# Patient Record
Sex: FEMALE | Race: WHITE | Employment: OTHER | URBAN - NONMETROPOLITAN AREA
[De-identification: names, ages, dates, MRNs, and addresses within clinical notes are randomized per-mention and may not be internally consistent; named-entity substitution may affect disease eponyms.]

---

## 2017-10-18 ENCOUNTER — OFFICE VISIT (OUTPATIENT)
Dept: ORTHOPEDIC SURGERY | Age: 62
End: 2017-10-18

## 2017-10-18 DIAGNOSIS — M79.644 FINGER PAIN, RIGHT: Primary | ICD-10-CM

## 2017-10-18 PROBLEM — E03.9 HYPOTHYROIDISM (ACQUIRED): Status: ACTIVE | Noted: 2017-10-18

## 2017-10-18 PROBLEM — F31.9 BIPOLAR 1 DISORDER (HCC): Status: ACTIVE | Noted: 2017-10-18

## 2017-10-18 PROCEDURE — 73140 X-RAY EXAM OF FINGER(S): CPT | Performed by: ORTHOPAEDIC SURGERY

## 2017-10-18 PROCEDURE — 99204 OFFICE O/P NEW MOD 45 MIN: CPT | Performed by: ORTHOPAEDIC SURGERY

## 2017-10-18 RX ORDER — ARIPIPRAZOLE 5 MG/1
TABLET ORAL
Refills: 3 | COMMUNITY
Start: 2017-10-11 | End: 2017-11-08 | Stop reason: SDUPTHER

## 2017-10-18 RX ORDER — ESZOPICLONE 2 MG/1
TABLET, FILM COATED ORAL
Refills: 3 | COMMUNITY
Start: 2017-10-04 | End: 2017-11-08 | Stop reason: ALTCHOICE

## 2017-10-18 RX ORDER — GABAPENTIN 300 MG/1
300-600 CAPSULE ORAL
COMMUNITY
End: 2017-11-08 | Stop reason: ALTCHOICE

## 2017-10-18 RX ORDER — ESZOPICLONE 3 MG/1
3 TABLET, FILM COATED ORAL
COMMUNITY
End: 2017-11-08 | Stop reason: ALTCHOICE

## 2017-10-18 RX ORDER — ARIPIPRAZOLE 5 MG/1
5 TABLET ORAL DAILY
COMMUNITY

## 2017-10-18 RX ORDER — INFLUENZA VIRUS VACCINE 15; 15; 15; 15 UG/.5ML; UG/.5ML; UG/.5ML; UG/.5ML
SUSPENSION INTRAMUSCULAR
Refills: 0 | COMMUNITY
Start: 2017-09-13

## 2017-10-18 RX ORDER — DIVALPROEX SODIUM 500 MG/1
1000 TABLET, EXTENDED RELEASE ORAL DAILY
COMMUNITY

## 2017-10-18 NOTE — PROGRESS NOTES
History of Present Illness:  Kostas Salguero is a 58 y.o. female 1st time for right thumb pain and catching locking pain in the morning    Location right thumb  Severity moderate  Duration several months  Associated sign/symptoms stiffness and soreness locking catching swelling    I have reviewed and discussed the below Pain assessment findings with the patient. Pain Assessment  Location of Pain: Hand  Location Modifiers: Right  Severity of Pain: 5  Quality of Pain: Sharp, Grinding  Duration of Pain: Persistent  Frequency of Pain: Intermittent  Aggravating Factors: Exercise  Limiting Behavior: Yes  Relieving Factors: Rest  Result of Injury: No  Work-Related Injury: No  Are there other pain locations you wish to document?: No    Medical History  Patient's medications, allergies, past medical, surgical, social and family histories were reviewed and updated as appropriate. History reviewed. No pertinent past medical history. History reviewed. No pertinent family history.   Social History     Social History    Marital status:      Spouse name: N/A    Number of children: N/A    Years of education: N/A     Social History Main Topics    Smoking status: None    Smokeless tobacco: None    Alcohol use None    Drug use: Unknown    Sexual activity: Not Asked     Other Topics Concern    None     Social History Narrative    None     Current Outpatient Prescriptions   Medication Sig Dispense Refill    ARIPiprazole (ABILIFY) 5 MG tablet TAKE ONE (1) TABLET BY MOUTH DAILY  3    ARIPiprazole (ABILIFY) 5 MG tablet Take 5 mg by mouth      divalproex (DEPAKOTE ER) 500 MG extended release tablet Take 1,500 mg by mouth      eszopiclone (LUNESTA) 2 MG TABS TAKE 1 TABLET BY MOUTH AT BEDTIME AS NEEDED  3    eszopiclone (LUNESTA) 3 MG TABS Take 3 mg by mouth      gabapentin (NEURONTIN) 300 MG capsule Take 300-600 mg by mouth      HYDROXYZINE HCL PO Take by mouth      FLUARIX QUADRIVALENT 0.5 ML injection TO BE

## 2017-11-02 DIAGNOSIS — M65.311 TRIGGER FINGER OF RIGHT THUMB: Primary | ICD-10-CM

## 2017-11-02 RX ORDER — MELOXICAM 15 MG/1
15 TABLET ORAL DAILY
Qty: 30 TABLET | Refills: 3 | Status: SHIPPED | OUTPATIENT
Start: 2017-11-02

## 2017-11-02 RX ORDER — HYDROCODONE BITARTRATE AND ACETAMINOPHEN 5; 325 MG/1; MG/1
1 TABLET ORAL EVERY 6 HOURS PRN
Qty: 40 TABLET | Refills: 0 | Status: SHIPPED | OUTPATIENT
Start: 2017-11-02 | End: 2017-11-09

## 2017-11-06 ENCOUNTER — TELEPHONE (OUTPATIENT)
Dept: ORTHOPEDIC SURGERY | Age: 62
End: 2017-11-06

## 2017-11-10 ENCOUNTER — HOSPITAL ENCOUNTER (OUTPATIENT)
Dept: SURGERY | Age: 62
Discharge: HOME | End: 2017-11-10
Attending: ORTHOPAEDIC SURGERY | Admitting: ORTHOPAEDIC SURGERY

## 2017-11-10 VITALS
BODY MASS INDEX: 31.23 KG/M2 | TEMPERATURE: 96.8 F | HEART RATE: 59 BPM | DIASTOLIC BLOOD PRESSURE: 72 MMHG | WEIGHT: 199 LBS | SYSTOLIC BLOOD PRESSURE: 110 MMHG | RESPIRATION RATE: 14 BRPM | HEIGHT: 67 IN | OXYGEN SATURATION: 100 %

## 2017-11-10 RX ORDER — DOCUSATE SODIUM 100 MG/1
100 CAPSULE, LIQUID FILLED ORAL 2 TIMES DAILY
Status: DISCONTINUED | OUTPATIENT
Start: 2017-11-10 | End: 2017-11-11 | Stop reason: HOSPADM

## 2017-11-10 RX ORDER — ACETAMINOPHEN 325 MG/1
650 TABLET ORAL EVERY 4 HOURS PRN
Status: DISCONTINUED | OUTPATIENT
Start: 2017-11-10 | End: 2017-11-11 | Stop reason: HOSPADM

## 2017-11-10 RX ORDER — LABETALOL HYDROCHLORIDE 5 MG/ML
5 INJECTION, SOLUTION INTRAVENOUS EVERY 10 MIN PRN
Status: DISCONTINUED | OUTPATIENT
Start: 2017-11-10 | End: 2017-11-11 | Stop reason: HOSPADM

## 2017-11-10 RX ORDER — SODIUM CHLORIDE, SODIUM LACTATE, POTASSIUM CHLORIDE, CALCIUM CHLORIDE 600; 310; 30; 20 MG/100ML; MG/100ML; MG/100ML; MG/100ML
INJECTION, SOLUTION INTRAVENOUS CONTINUOUS
Status: DISCONTINUED | OUTPATIENT
Start: 2017-11-10 | End: 2017-11-11 | Stop reason: HOSPADM

## 2017-11-10 RX ORDER — PROPRANOLOL HYDROCHLORIDE 40 MG/1
40 TABLET ORAL 2 TIMES DAILY
COMMUNITY

## 2017-11-10 RX ORDER — MEPERIDINE HYDROCHLORIDE 25 MG/ML
12.5 INJECTION INTRAMUSCULAR; INTRAVENOUS; SUBCUTANEOUS EVERY 5 MIN PRN
Status: DISCONTINUED | OUTPATIENT
Start: 2017-11-10 | End: 2017-11-11 | Stop reason: HOSPADM

## 2017-11-10 RX ORDER — HYDROMORPHONE HCL 110MG/55ML
0.25 PATIENT CONTROLLED ANALGESIA SYRINGE INTRAVENOUS EVERY 5 MIN PRN
Status: DISCONTINUED | OUTPATIENT
Start: 2017-11-10 | End: 2017-11-11 | Stop reason: HOSPADM

## 2017-11-10 RX ORDER — OXYCODONE HYDROCHLORIDE AND ACETAMINOPHEN 5; 325 MG/1; MG/1
1 TABLET ORAL
Status: ACTIVE | OUTPATIENT
Start: 2017-11-10 | End: 2017-11-10

## 2017-11-10 RX ORDER — SODIUM CHLORIDE 0.9 % (FLUSH) 0.9 %
10 SYRINGE (ML) INJECTION PRN
Status: DISCONTINUED | OUTPATIENT
Start: 2017-11-10 | End: 2017-11-11 | Stop reason: HOSPADM

## 2017-11-10 RX ORDER — HYDRALAZINE HYDROCHLORIDE 20 MG/ML
5 INJECTION INTRAMUSCULAR; INTRAVENOUS EVERY 10 MIN PRN
Status: DISCONTINUED | OUTPATIENT
Start: 2017-11-10 | End: 2017-11-11 | Stop reason: HOSPADM

## 2017-11-10 RX ORDER — ONDANSETRON 2 MG/ML
4 INJECTION INTRAMUSCULAR; INTRAVENOUS
Status: ACTIVE | OUTPATIENT
Start: 2017-11-10 | End: 2017-11-10

## 2017-11-10 RX ORDER — CEFAZOLIN SODIUM 2 G/100ML
2 INJECTION, SOLUTION INTRAVENOUS ONCE
Status: COMPLETED | OUTPATIENT
Start: 2017-11-10 | End: 2017-11-10

## 2017-11-10 RX ORDER — LIDOCAINE HYDROCHLORIDE 10 MG/ML
1 INJECTION, SOLUTION EPIDURAL; INFILTRATION; INTRACAUDAL; PERINEURAL
Status: ACTIVE | OUTPATIENT
Start: 2017-11-10 | End: 2017-11-10

## 2017-11-10 RX ORDER — ONDANSETRON 2 MG/ML
4 INJECTION INTRAMUSCULAR; INTRAVENOUS EVERY 6 HOURS PRN
Status: DISCONTINUED | OUTPATIENT
Start: 2017-11-10 | End: 2017-11-11 | Stop reason: HOSPADM

## 2017-11-10 RX ORDER — SODIUM CHLORIDE 0.9 % (FLUSH) 0.9 %
10 SYRINGE (ML) INJECTION EVERY 12 HOURS SCHEDULED
Status: DISCONTINUED | OUTPATIENT
Start: 2017-11-10 | End: 2017-11-11 | Stop reason: HOSPADM

## 2017-11-10 RX ADMIN — SODIUM CHLORIDE, SODIUM LACTATE, POTASSIUM CHLORIDE, CALCIUM CHLORIDE: 600; 310; 30; 20 INJECTION, SOLUTION INTRAVENOUS at 13:54

## 2017-11-10 RX ADMIN — CEFAZOLIN SODIUM 2 G: 2 INJECTION, SOLUTION INTRAVENOUS at 14:07

## 2017-11-10 ASSESSMENT — PAIN SCALES - GENERAL: PAINLEVEL_OUTOF10: 0

## 2017-11-10 ASSESSMENT — PAIN - FUNCTIONAL ASSESSMENT: PAIN_FUNCTIONAL_ASSESSMENT: 0-10

## 2017-11-10 NOTE — ANESTHESIA POST-OP
Anesthesia Post-op Note    Patient: Mane Thao  MRN: [id number]  YOB: 1955  Date of evaluation: 11/10/2017  Time:  2:59 PM     Procedure(s) Performed:     Last Vitals: /66   Pulse 55   Temp 97.2 °F (36.2 °C) (Temporal)   Resp 14   Ht 5' 7\" (1.702 m)   Wt 199 lb (90.3 kg)   SpO2 96%   BMI 31.17 kg/m²     George Phase I: George Score: 8    George Phase II:      Anesthesia Post Evaluation    Final anesthesia type: MAC  Patient location during evaluation: PACU  Patient participation: complete - patient participated  Level of consciousness: awake and alert  Airway patency: patent  Nausea & Vomiting: no nausea and no vomiting  Complications: no  Cardiovascular status: hemodynamically stable  Respiratory status: acceptable  Hydration status: stable        Aric March MD  2:59 PM

## 2017-11-10 NOTE — OP NOTE
[facility]       [address] [facility], [address] [place] [address]       Operative note by  Marcial Washington. Monique Avina MS, DO  Orthopedic surgeon  Orthopedics sports Fellowship trained  Board-certified  Team Physician for [employer]                       trigger finger release      Patient Name:  Mar Black    YOB: 1955    Medical  Record number: [id number]    Account number: [de-identified]     Date Of Surgery: 11/10/2017    Date Of Dictation: 11/10/2017    Location: [address]    Surgeon: Dr. Martha Ordaz    Assistant: None    Anesthesia: Local with General    Indications:  Chronic pain not alleviated by conservative therapy, positive MRI, not improved with conservative care    Complications: None    Estimated blood loss: Minimal    Preoperative antibiotics: Given and documented in the chart        Preoperative diagnosis :  1. Right thumb A1 pulley trigger finger            Postoperative diagnosis :  1. Right thumb A1 pulley trigger finger          Procedure performed:  1. Right thumb A1 pulley release             Procedure in detail:  Patient was seen and evaluated A history and physical was obtained a written consent was discussed and signed by the patient. Following the anesthesia evaluation and discussion with the patient about the scheduled procedure and recovery along with postoperative follow-up plans the patient was brought back to the operative suite put on the operative table in the supine position. At this point the patient was given a MAC anesthetic. I personally scrubbed the hand with alcohol and Betadine and injected the base of the right thumb with 5 mL total 5 mL of Marcaine 5 mL of lidocaine the lidocaine did not have epinephrine using an 22-gauge 1-1/2 inch needle.                 Following the evaluation and injection the hand was

## 2017-11-10 NOTE — ANESTHESIA PRE-OP
consumption: 11/10/17    BMI:   Wt Readings from Last 3 Encounters:   11/10/17 199 lb (90.3 kg)   11/08/17 200 lb (90.7 kg)     Body mass index is 31.17 kg/m². Anesthesia Evaluation  Patient summary reviewed and Nursing notes reviewed no history of anesthetic complications:   Airway: Mallampati: II  TM distance: >3 FB   Neck ROM: full  Mouth opening: > = 3 FB Dental:          Pulmonary:normal exam  breath sounds clear to auscultation      (-) COPD, asthma and sleep apnea                        ROS comment: Former smoker   Cardiovascular:negative ROS        (-) hypertension, past MI, CAD, CABG/stent, dysrhythmias,  angina and  CHF      Rhythm: regular  Rate: normal                    Neuro/Psych:   (+) neuromuscular disease (Tremors):, psychiatric history (Depression, Bipolar, Anxiety):   (-) seizures, TIA and CVA           GI/Hepatic/Renal: neg ROS       (-) GERD and liver disease       Endo/Other:    (+) hypothyroidism::. (-) no Type II DM               Abdominal:   (+) obese,         Vascular:                                      Anesthesia Plan      MAC     ASA 2       Induction: intravenous. Anesthetic plan and risks discussed with patient. Plan discussed with CRNA.                   Isai White MD   11/10/2017

## 2017-11-10 NOTE — PROGRESS NOTES
Pt arrived to PACU from OR in stable condition and is alert to self. Pt moves extremities to command. Respirations are even and unlabored on 3L O2 per NC. Skin warm, dry, and with normal color. Abd is soft. Pain is denied at this time. Right thumb incision surgical site(s) intact with dressing= CDI. Will continue to monitor for safety and comfort. S/P:RIGHT THUMB A-ONE PULLY TRIGGER RELEASE , with Dr. Maritza Dodson at [facility]. ASA 2, Mac anesthesia, placed in phase two care.

## 2017-11-15 ENCOUNTER — OFFICE VISIT (OUTPATIENT)
Dept: ORTHOPEDIC SURGERY | Age: 62
End: 2017-11-15

## 2017-11-15 DIAGNOSIS — M65.311 TRIGGER FINGER OF RIGHT THUMB: Primary | ICD-10-CM

## 2017-11-15 PROCEDURE — 99024 POSTOP FOLLOW-UP VISIT: CPT | Performed by: ORTHOPAEDIC SURGERY

## 2017-11-15 NOTE — PROGRESS NOTES
History of Present Illness:  Ariane Amaral is a 58 y.o. female postop right thumb A1 trigger release    Location right thumb  Severity doing great completely resolved no catching or locking  Duration 5 days  Associated sign/symptoms no pain no swelling she is doing great    I have reviewed and discussed the below Pain assessment findings with the patient. Medical History  Patient's medications, allergies, past medical, surgical, social and family histories were reviewed and updated as appropriate. Past Medical History:   Diagnosis Date    Anxiety     Cancer [facility])     BREAST CANCER    Depression     Thyroid disease      Family History   Problem Relation Age of Onset    Cancer Mother     Cancer Father      Social History     Social History    Marital status:      Spouse name: N/A    Number of children: N/A    Years of education: N/A     Social History Main Topics    Smoking status: Former Smoker    Smokeless tobacco: Never Used      Comment: QUIT [id number]    Alcohol use No    Drug use: No    Sexual activity: Not Asked     Other Topics Concern    None     Social History Narrative    None     Current Outpatient Prescriptions   Medication Sig Dispense Refill    propranolol (INDERAL) 40 MG tablet Take 40 mg by mouth 2 times daily For tremor      LEVOTHYROXINE SODIUM PO Take 125 mg by mouth daily       meloxicam (MOBIC) 15 MG tablet Take 1 tablet by mouth daily 30 tablet 3    ARIPiprazole (ABILIFY) 5 MG tablet Take 5 mg by mouth daily       divalproex (DEPAKOTE ER) 500 MG extended release tablet Take 1,000 mg by mouth daily       HYDROXYZINE HCL PO Take 25 mg by mouth 3 times daily as needed       FLUARIX QUADRIVALENT 0.5 ML injection TO BE ADMINISTERED BY PHARMACIST FOR IMMUNIZATION  0    sertraline (ZOLOFT) 50 MG tablet TAKE ONE (1) TABLET BY MOUTH AT BEDTIME  2     No current facility-administered medications for this visit.       Allergies   Allergen Reactions    Fluticasone Other (See Comments)     tamie    Prednisone Other (See Comments)     tamie       REVIEW OF SYSTEMS:   Pertinent items are noted in HPI  Review of systems reviewed from Patient History Form dated on 11/15/17 and available in the patient's chart under the Media tab. Examination:    General Exam:    Vitals: There were no vitals taken for this visit. Constitutional: Patient is adequately groomed with no evidence of malnutrition  Mental Status: The patient is oriented to time, place and person. The patient's mood and affect are appropriate. thumb Examination  Inspection: no swelling no deformity nicely healed incision     Palpation:  No pain to palpation    Rang of Motion:  Full active and passive range of motion with no catching or locking no clicking    Strength:  Fingers strength is excellent there is no tendon injury    Special Tests:  Sensation is intact capillary refill is brisk incision is clean and dry no erythema no accidents    Gait: Normal    Additional Comments:     Additional Examinations:  Left Upper Extremity: Examination of the left upper extremity does not show any tenderness, deformity or injury. Range of motion is unremarkable. There is no gross instability. There are no rashes, ulcerations or lesions. Strength and tone are normal.  Neck: Examination of the neck does not show any tenderness, deformity or injury. Range of motion is unremarkable. There is no gross instability. There are no rashes, ulcerations or lesions. Strength and tone are normal.              Assessment:  Right postop A1 Pulley release     Impression: Same    Office Procedures:  No orders of the defined types were placed in this encounter.       Treatment Plan:  Postop right thumb diffuses follow-up in a few weeks sooner if she should've any concerns or problems         Janel Steve,

## 2020-08-05 ENCOUNTER — OFFICE VISIT (OUTPATIENT)
Dept: ORTHOPEDIC SURGERY | Age: 65
End: 2020-08-05
Payer: MEDICARE

## 2020-08-05 VITALS — BODY MASS INDEX: 28.25 KG/M2 | HEIGHT: 67 IN | WEIGHT: 180 LBS

## 2020-08-05 PROCEDURE — 99204 OFFICE O/P NEW MOD 45 MIN: CPT | Performed by: ORTHOPAEDIC SURGERY

## 2020-08-05 NOTE — PROGRESS NOTES
8/5/20  History of Present Illness:  Jessica Izaguirre is a 72 y.o. female being seen for the first time right-sided SI joint/lumbar pain right-sided    Chief complaint that brought the patient in the office today: Pain right-sided lumbar      Location right sided lumbar pain and also SI joint  Severity moderate  Duration of the months  Associated sign/symptoms pain, loss of motion, pain with sitting pain with activities been going on for about 20 years    I have reviewed and discussed the below Pain assessment findings with the patient. Pain Assessment  Location of Pain: Back  Location Modifiers: Right, Posterior  Severity of Pain: 7  Quality of Pain: Sharp, Aching  Duration of Pain: Persistent  Frequency of Pain: Intermittent  Aggravating Factors: Other (Comment)(sitting on couch)  Limiting Behavior: Some  Relieving Factors: Rest, Other (Comment)(stretching)  Result of Injury: No  Work-Related Injury: No  Are there other pain locations you wish to document?: No    Medical History  Patient's medications, allergies, past medical, surgical, social and family histories were reviewed and updated as appropriate.     Past Medical History:   Diagnosis Date    Anxiety     Cancer [facility])     BREAST CANCER    Depression     Thyroid disease      Family History   Problem Relation Age of Onset    Cancer Mother     Cancer Father      Social History     Socioeconomic History    Marital status:      Spouse name: None    Number of children: None    Years of education: None    Highest education level: None   Occupational History    None   Social Needs    Financial resource strain: None    Food insecurity     Worry: None     Inability: None    Transportation needs     Medical: None     Non-medical: None   Tobacco Use    Smoking status: Former Smoker    Smokeless tobacco: Never Used    Tobacco comment: QUIT X30YRS   Substance and Sexual Activity    Alcohol use: No    Drug use: No    Sexual activity: None Lifestyle    Physical activity     Days per week: None     Minutes per session: None    Stress: None   Relationships    Social connections     Talks on phone: None     Gets together: None     Attends Hoahaoism service: None     Active member of club or organization: None     Attends meetings of clubs or organizations: None     Relationship status: None    Intimate partner violence     Fear of current or ex partner: None     Emotionally abused: None     Physically abused: None     Forced sexual activity: None   Other Topics Concern    None   Social History Narrative    None     Current Outpatient Medications   Medication Sig Dispense Refill    propranolol (INDERAL) 40 MG tablet Take 40 mg by mouth 2 times daily For tremor      LEVOTHYROXINE SODIUM PO Take 125 mg by mouth daily       meloxicam (MOBIC) 15 MG tablet Take 1 tablet by mouth daily 30 tablet 3    ARIPiprazole (ABILIFY) 5 MG tablet Take 5 mg by mouth daily       divalproex (DEPAKOTE ER) 500 MG extended release tablet Take 1,000 mg by mouth daily       HYDROXYZINE HCL PO Take 25 mg by mouth 3 times daily as needed       FLUARIX QUADRIVALENT 0.5 ML injection TO BE ADMINISTERED BY PHARMACIST FOR IMMUNIZATION  0    sertraline (ZOLOFT) 50 MG tablet TAKE ONE (1) TABLET BY MOUTH AT BEDTIME  2     No current facility-administered medications for this visit. Allergies   Allergen Reactions    Fluticasone Other (See Comments)     tamie    Prednisone Other (See Comments)     tamie       REVIEW OF SYSTEMS:   No rashes today  No new numbness  No tingling  No fevers  No depression  No new onset of pain      Pertinent items are noted in HPI  Review of systems reviewed from Patient History Form dated on 8/5/2020 and available in the patient's chart under the Media tab. Examination:    Vitals: Height 5' 7\" (1.702 m), weight 180 lb (81.6 kg).   BMI Readings from Last 3 Encounters:   08/05/20 28.19 kg/m²   11/10/17 31.17 kg/m²   11/08/17 31.32 kg/m²       LUMBAR/SACRAL EXAMINATION:  · Inspection: Local inspection shows no step-off or bruising. Lumbar alignment is normal. No instability is noted. · Constitutional: Patient is adequately groomed with no evidence of malnutrition  · Lymphatic: The lymphatic examination bilaterally reveals all areas to be without enlargement or induration. · Mental Status: The patient is oriented to time, place and person. The patient's mood and   affect are appropriate. · Vascular: Examination reveals no swelling or calf tenderness. Peripheral pulses are palpable and 2+. · Palpation:   No evidence of tenderness at the midline. Lumbar paraspinal tenderness Mild L4/5 and L5/S1 tenderness  Bursal tenderness Bilateral greater trochanteric bursa tenderness  There is no paraspinal spasm. · Range of Motion: limited by 25% in all planes due to pain  · Strength:   Strength testing is 5/5 in all muscle groups tested. · Special Tests:   Straight leg raise and crossed SLR negative. Ulysses's testing is negative bilaterally. FADIR's testing is negative bilaterally. Slump test negative. Bowstring test negative  · Skin: There are no rashes, ulcerations or lesions. · Reflexes: Reflexes are symmetrically 2+ at the patellar and ankle tendons. Clonus absent bilaterally at the feet. · Gait & station: normal, patient ambulates without assistance and no ataxia    · Additional Examinations:    · RIGHT LOWER EXTREMITY: Inspection/examination of the right lower extremity does not show any tenderness, deformity or injury. Range of motion is unremarkable. There is no gross instability. There are no rashes, ulcerations or lesions. Strength and tone are normal. No atrophy or abnormal movements are noted. · LEFT LOWER EXTREMITY:  Inspection/examination of the left lower extremity does not show any tenderness, deformity or injury. Range of motion is unremarkable. There is no gross instability.  There are no rashes, recognition software. Though review and correction are routine, we apologize for any errors. \"

## 2020-08-10 ENCOUNTER — HOSPITAL ENCOUNTER (OUTPATIENT)
Dept: PHYSICAL THERAPY | Age: 65
Setting detail: THERAPIES SERIES
Discharge: HOME | End: 2020-08-10
Payer: MEDICARE

## 2020-08-10 PROCEDURE — 97161 PT EVAL LOW COMPLEX 20 MIN: CPT

## 2020-08-10 PROCEDURE — 97140 MANUAL THERAPY 1/> REGIONS: CPT

## 2020-08-10 NOTE — PLAN OF CARE
[place]  Phone: [phone]   Fax:     [phone]                                                       Physical Therapy Certification    Dear Referring Practitioner: Dr. Elvin Bañuelos,    We had the pleasure of evaluating the following patient for physical therapy services at [address] [place] [address]. A summary of our findings can be found in the initial assessment below. This includes our plan of care. If you have any questions or concerns regarding these findings, please do not hesitate to contact me at the office phone number checked above. Thank you for the referral.       Physician Signature:_______________________________Date:__________________  By signing above (or electronic signature), therapists plan is approved by physician      Patient: Carlyn Stevens   : 1955   MRN: [id number]  Referring Physician: Referring Practitioner: Dr. Elvin Bañuelos      Evaluation Date: 8/10/2020      Medical Diagnosis Information:  Diagnosis: M54.5 Lumbar pain   Treatment Diagnosis: M54.5 lumbar pain                                         Insurance information: PT Insurance Information: medicare     Precautions/ Contra-indications: osteopenia, hx of breat cancer x16 years ago  Latex Allergy:  [x]NO      []YES  Preferred Language for Healthcare:   [x]English       []other:    C-SSRS Triggered by Intake questionnaire (Past 2 wk assessment ):   [] No, Questionnaire did not trigger screening. [x] Yes, Patient intake triggered C-SSRS Screening      [x] C-SSRS Screening completed  [] PCP notified via Epic     SUBJECTIVE: Patient stated complaint: Pt presents for evaluation of chronic low back pain. Pt states pain isn't there most of the time but when it is it is R low back. Describes pain as sharp and pinching with increased pressure. Denies radicular pain.  States she notices it the day after a pilates class or at the beginning of the class if it is already sore but then works its way out. Denies thecal signs including changes in sx w/ increased abdominal pressure and no relation to eating. Relevant Medical History: bipolar disorder, anxiety, depression, osteopenia  Functional Scale/Score: JV: 5/60 = 12% disability    Pain Scale: 0-7/10  Easing factors:  Advil, stretching  Provocative factors: day after pilates, sitting for prolonged periods on soft surfaces (car, bed, couch), sex       Type: []Constant   [x]Intermittent  []Radiating []Localized []other:     Numbness/Tingling: pt denies    Occupation/School: retired     Living Status/Prior Level of Function: Independent with ADLs and IADLs; pilates/yoga    OBJECTIVE:   Repeated Movements: normal    ROM  Comments   Lumbar Flex WFL R lateral shift prior to going into flexion   Lumbar Ext WFL No sx     ROM LEFT RIGHT Comments   Lumbar Side Bend [facility] WFL No sx   Lumbar Rotation WFL WFL No sx   Quadrant normal Normal* *soreness in R low back w/ overpressure to R   Hip Flexion 120 120    Hip Abd      Hip ER 45 45    Hip IR 40 35* R IR w/ OP recreated low back pain   Hip Extension 10 10    Knee Ext 0 0    Knee Flex 125 125    Hamstring Flex 150 150    Piriformis      Mane test                Myotomes/Strength Normal Abnormal Comments   [x]ALL NORMAL      Hip Abd x  4-/5   Hip Ext x  4-/5   Hip flexion (L1-L2 femoral) [] []    Knee extension (L2-L4 femoral) [] []    Knee flexion (S1 sciatic)      Dorsiflexion (L4-L5 deep peroneal) [] []    Great Toe Ext (L5 deep peroneal nerve) [] []    Ankle Eversion (S1-S2 super peroneal) [] []    Ankle PF(S1-S2 tibial) [] []    Multifidus [] []    Transverse Ab [] []      Dermatomes Normal Abnormal Comments   [x]ALL NORMAL            inguinal area (L1)  [] []    anterior mid-thigh (L2) [] []    distal ant thigh/med knee (L3) [] []    medial lower leg and foot (L4) [] []    lateral lower leg and foot (L5) [] []    posterior calf Cardiovascular conditions   []Hypertension (I10)  []Hyperlipidemia (E78.5)  []Angina pectoris (I20)  []Atherosclerosis (I70)  []CVA Musculoskeletal conditions   []Disc pathology   []Congenital spine pathologies   []Prior surgical intervention  []Osteoporosis (M81.8)  [x]Osteopenia (M85.8)   Endocrine conditions   []Hypothyroid (E03.9)  []Hyperthyroid Gastrointestinal conditions   []Constipation (E04.25)   Metabolic conditions   []Morbid obesity (E66.01)  []Diabetes type 1(E10.65) or 2 (E11.65)   []Neuropathy (G60.9)     Pulmonary conditions   []Asthma (J45)  []Coughing   []COPD (J44.9)   Psychological Disorders  [x]Anxiety (F41.9)  [x]Depression (F32.9)   []Other:   []Other:           Barriers to/and or personal factors that will affect rehab potential:              []Age  []Sex    []Smoker              []Motivation/Lack of Motivation                        []Co-Morbidities              []Cognitive Function, education/learning barriers              []Environmental, home barriers              []profession/work barriers  []past PT/medical experience  []other:  Justification:     Falls Risk Assessment (30 days):   [x] Falls Risk assessed and no intervention required. [] Falls Risk assessed and Patient requires intervention due to being higher risk   TUG score (>12s at risk):     [] Falls education provided, including:         ASSESSMENT: Pt presents for evaluation of R low back pain. Upon assessment, pt presents w/ increased TTP to R lumbar musculature w/ increased guarding w/ R lumbar PA's and signs consistent w/ facet dysfunction R side, decreased R hip mobility w/ reproduction of sx w/ IR w/ OP and ALISSA position, decreased glute facilitation without lumbar compensation, and decreased proximal hip stabilization w/ ambulation and single leg stance. R hip IR and ALISSA without reproduction of sx following lateral and inferior hip belt mobilizations.  Mild lumbar compensation w/ initiation of glute facilitation in prone. Pt will benefit from skilled PT to improve functional strength and mobility. Functional Impairments:     [x]Noted lumbar/proximal hip hypomobility   []Noted lumbosacral and/or generalized hypermobility   []Decreased Lumbosacral/hip/LE functional ROM   [x]Decreased core/proximal hip strength and neuromuscular control    [x]Decreased LE functional strength    []Abnormal reflexes/sensation/myotomal/dermatomal deficits  []Reduced balance/proprioceptive control    []other:      Functional Activity Limitations (from functional questionnaire and intake)   [x]Reduced ability to tolerate prolonged functional positions   []Reduced ability or difficulty with changes of positions or transfers between positions   [x]Reduced ability to maintain good posture and demonstrate good body mechanics with sitting, bending, and lifting   []Reduced ability to sleep   [x] Reduced ability or tolerance with driving and/or computer work   []Reduced ability to perform lifting, reaching, carrying tasks   [x]Reduced ability to squat   []Reduced ability to forward bend   []Reduced ability to ambulate prolonged functional periods/distances/surfaces   []Reduced ability to ascend/descend stairs   []other:       Participation Restrictions   [x]Reduced participation in self care activities   []Reduced participation in home management activities   []Reduced participation in work activities   []Reduced participation in social activities. [x]Reduced participation in sport/recreational activities. Classification:   []Signs/symptoms consistent with Lumbar instability/stabilization subgroup. []Signs/symptoms consistent with Lumbar mobilization/manipulation subgroup, myotomes and dermatomes intact. Meets manipulation criteria.     []Signs/symptoms consistent with Lumbar direction specific/centralization subgroup   []Signs/symptoms consistent with Lumbar traction subgroup       [x]Signs/symptoms consistent with lumbar facet Therapeutic exercise including: strength training, ROM, for LE, Glutes and core   [x]  NMR activation and proprioception for glutes , LE and Core   [x]  Manual therapy as indicated for Hip complex, LE and spine to include: Dry Needling/IASTM, STM, PROM, Gr I-IV mobilizations, manipulation. [x]  Modalities as needed that may include: thermal agents, E-stim, Biofeedback, US, iontophoresis as indicated  [x]  Patient education on joint protection, postural re-education, activity modification, progression of HEP. HEP instruction: (see scanned forms)    GOALS:  Patient stated goal: \"get back to doing pilates and riding in a car without pain\"  [] Progressing: [] Met: [x] Not Met: [] Adjusted  Therapist goals for Patient:   Short Term Goals: To be achieved in: 2 weeks  1. Independent in HEP and progression per patient tolerance, in order to prevent re-injury. [] Progressing: [] Met: [x] Not Met: [] Adjusted  2. Patient will have a decrease in pain to facilitate improvement in movement, function, and ADLs as indicated by Functional Deficits. [] Progressing: [] Met: [x] Not Met: [] Adjusted    Long Term Goals: To be achieved in: 4 weeks  1. Disability index score of 10% or less for the JV to assist with reaching prior level of function. [] Progressing: [] Met: [x] Not Met: [] Adjusted  2. Patient will demonstrate increased AROM to WNL, good LS mobility, good hip ROM to allow for proper joint functioning as indicated by patients Functional Deficits. [] Progressing: [] Met: [x] Not Met: [] Adjusted  3. Patient will demonstrate an increase in Strength to good proximal hip and core activation to allow for proper functional mobility as indicated by patients Functional Deficits. [] Progressing: [] Met: [x] Not Met: [] Adjusted  4. Patient will return to daily functional activities including position changes without increased symptoms or restriction. [] Progressing: [] Met: [x] Not Met: [] Adjusted  5.  Patient will

## 2020-08-10 NOTE — FLOWSHEET NOTE
[place]  [address] [facility], [place] [address]  Phone: [phone] Fax: [phone]    Physical Therapy Treatment Note/ Progress Report:     Date:  8/10/2020    Patient Name:  Martha Howell    :  1955  MRN: [id number]  Restrictions/Precautions:    Medical/Treatment Diagnosis Information:  · Diagnosis: M54.5 Lumbar pain  · Treatment Diagnosis: M54.5 lumbar pain  Insurance/Certification information:  PT Insurance Information: medicare  Physician Information:  Referring Practitioner: Dr. Lorna Bonilla of care signed (Y/N):     Date of Patient follow up with Physician:      Progress Report: [x]  Yes  []  No     Date Range for reporting period:  Beginnin/10/2020  Ending:      Progress report due (10 Rx/or 30 days whichever is less): 8670     Recertification due (POC duration/ or 90 days whichever is less):  2020    Visit # Insurance Allowable Auth Needed   1 MC []Yes   [x]No     Latex Allergy:  [x]NO      []YES  Preferred Language for Healthcare:   [x]English       []other:  Functional Scale: JV: 12%  Date assessed:8/10/2020    Pain level:  0-7/10     SUBJECTIVE:  See eval    OBJECTIVE: See eval   Observation:    Test measurements:      RESTRICTIONS/PRECAUTIONS: osteopenia, hx of breast cancer x16 years ago    Exercises/Interventions:     Therapeutic Ex (69582)   Min: Sets/sec Reps Notes/CUES   Retro Stepper/BIKE      Alter G      BFR      Sportcord March      3 way SLR      SAQ      Clam ABD      Hip Ext Viki Amble      BOSU fwd/side lunge      BOSU squat      Leg Press Iso/Con/Ecc 0-      Cybex HS curl      TKE      Glute side walks      RDL      Slide Lunge      Slide HS eccentrics      Step ups/ecc step down      Swissball wall rolls- in SLS- hip drive      Quad hip ext/wall-ball rolls      Inferior hip stretch 30'' 3 Figure 4         Manual Intervention (23120)  Min:      Knee mobs/PROM      Tib/Fem Mobs      Patella Mobs Ankle mobs      Hip belt mobs 8'  Latera, inferior grade 2-3         NMR re-education (34115)  Min:   CUES NEEDED   Georgian/Biofeedback 10/10      BFR      G. Med activation      Hip Ext full ROM/ G. Activation      Bosu Bal and Prop- G Med      Single leg stance/Balance/Prop      Bosu Retro G. Med act      Prone Hip froggers- sliders/elevated      TrA activation 5'' 10    Prone glute max activation 5'' 10                Therapeutic Activity (47610)  Min:      Ladders      Plyos      Dynamic Balance                            Therapeutic Exercise and NMR EXR  [x] (30060) Provided verbal/tactile cueing for activities related to strengthening, flexibility, endurance, ROM for improvements in LE, proximal hip, and core control with self care, mobility, lifting, ambulation. [x] (59970) Provided verbal/tactile cueing for activities related to improving balance, coordination, kinesthetic sense, posture, motor skill, proprioception  to assist with LE, proximal hip, and core control in self care, mobility, lifting, ambulation and eccentric single leg control.      NMR and Therapeutic Activities:    [x] (26748 or 91313) Provided verbal/tactile cueing for activities related to improving balance, coordination, kinesthetic sense, posture, motor skill, proprioception and motor activation to allow for proper function of core, proximal hip and LE with self care and ADLs and functional mobility.   [] (43865) Gait Re-education- Provided training and instruction to the patient for proper LE, core and proximal hip recruitment and positioning and eccentric body weight control with ambulation re-education including up and down stairs     Home Exercise Program:    [x] (71455) Reviewed/Progressed HEP activities related to strengthening, flexibility, endurance, ROM of core, proximal hip and LE for functional self-care, mobility, lifting and ambulation/stair navigation   [] (83994)Reviewed/Progressed HEP activities related to improving balance, coordination, kinesthetic sense, posture, motor skill, proprioception of core, proximal hip and LE for self care, mobility, lifting, and ambulation/stair navigation      Manual Treatments:  PROM / STM / Oscillations-Mobs:  G-I, II, III, IV (PA's, Inf., Post.)  [] (55450) Provided manual therapy to mobilize LE, proximal hip and/or LS spine soft tissue/joints for the purpose of modulating pain, promoting relaxation,  increasing ROM, reducing/eliminating soft tissue swelling/inflammation/restriction, improving soft tissue extensibility and allowing for proper ROM for normal function with self care, mobility, lifting and ambulation. Modalities:     [] GAME READY (VASO)- for significant edema, swelling, pain control. Charges:  Timed Code Treatment Minutes: 20   Total Treatment Minutes: 45      [x] EVAL (LOW) 92606 (typically 20 minutes face-to-face)  [] EVAL (MOD) 77149 (typically 30 minutes face-to-face)  [] EVAL (HIGH) 05001 (typically 45 minutes face-to-face)  [] RE-EVAL     [] AF(98343) x     [] DRY NEEDLE 1 OR 2 MUSCLES  [] NMR (09986) x     [] DRY NEEDLE 3+ MUSCLES  [x] Manual (11314) x 1      [] TA (22960) x     [] Mech Traction (02060)  [] ES(attended) (50549)     [] ES (un) (50460):   [] VASO (92437)  [] Other:        GOALS:  Patient stated goal: \"get back to doing pilates and riding in a car without pain\"  [] Progressing: [] Met: [x] Not Met: [] Adjusted  Therapist goals for Patient:   Short Term Goals: To be achieved in: 2 weeks  1. Independent in HEP and progression per patient tolerance, in order to prevent re-injury. [] Progressing: [] Met: [x] Not Met: [] Adjusted  2. Patient will have a decrease in pain to facilitate improvement in movement, function, and ADLs as indicated by Functional Deficits. [] Progressing: [] Met: [x] Not Met: [] Adjusted    Long Term Goals: To be achieved in: 4 weeks  1.  Disability index score of 10% or less for the JV to assist with reaching prior level of function. [] Progressing: [] Met: [x] Not Met: [] Adjusted  2. Patient will demonstrate increased AROM to WNL, good LS mobility, good hip ROM to allow for proper joint functioning as indicated by patients Functional Deficits. [] Progressing: [] Met: [x] Not Met: [] Adjusted  3. Patient will demonstrate an increase in Strength to good proximal hip and core activation to allow for proper functional mobility as indicated by patients Functional Deficits. [] Progressing: [] Met: [x] Not Met: [] Adjusted  4. Patient will return to daily functional activities including position changes without increased symptoms or restriction. [] Progressing: [] Met: [x] Not Met: [] Adjusted  5. Patient will be able to complete a pilates class without increased sx or restriction. [] Progressing: [] Met: [x] Not Met: [] Adjusted       ASSESSMENT:  See eval            Treatment/Activity Tolerance:  [x] Patient tolerated treatment well [] Patient limited by fatique  [] Patient limited by pain  [] Patient limited by other medical complications  [] Other:     Overall Progression Towards Functional goals/ Treatment Progress Update:  [] Patient is progressing as expected towards functional goals listed. [] Progression is slowed due to complexities/Impairments listed. [] Progression has been slowed due to co-morbidities.   [x] Plan just implemented, too soon to assess goals progression <30days   [] Goals require adjustment due to lack of progress  [] Patient is not progressing as expected and requires additional follow up with physician  [] Other    Prognosis for POC: [x] Good [] Fair  [] Poor    Patient requires continued skilled intervention: [x] Yes  [] No        PLAN: See eval  [] Continue per plan of care [] Alter current plan (see comments)  [x] Plan of care initiated [] Hold pending MD visit [] Discharge    Electronically signed by: Gilberto Benavides PT    Note: If patient does not return for scheduled/recommended follow up

## 2020-08-17 ENCOUNTER — HOSPITAL ENCOUNTER (OUTPATIENT)
Dept: PHYSICAL THERAPY | Age: 65
Setting detail: THERAPIES SERIES
Discharge: HOME | End: 2020-08-17
Payer: MEDICARE

## 2020-08-17 PROCEDURE — 97112 NEUROMUSCULAR REEDUCATION: CPT

## 2020-08-17 PROCEDURE — 97140 MANUAL THERAPY 1/> REGIONS: CPT

## 2020-08-17 PROCEDURE — 97110 THERAPEUTIC EXERCISES: CPT

## 2020-08-17 NOTE — FLOWSHEET NOTE
[place] [address] [facility]  Phone: [phone] Fax: [phone]    Physical Therapy Treatment Note/ Progress Report:     Date:  2020    Patient Name:  Carlyn Stevens    :  1955  MRN: [id number]  Restrictions/Precautions:    Medical/Treatment Diagnosis Information:  · Diagnosis: M54.5 Lumbar pain  · Treatment Diagnosis: M54.5 lumbar pain  Insurance/Certification information:  PT Insurance Information: medicare  Physician Information:  Referring Practitioner: Dr. Cheyenne Garrido of care signed (Y/N):     Date of Patient follow up with Physician:      Progress Report: []  Yes  [x]  No     Date Range for reporting period:  Beginnin/10/2020  Ending:      Progress report due (10 Rx/or 30 days whichever is less):      Recertification due (POC duration/ or 90 days whichever is less):  2020    Visit # Insurance Allowable Auth Needed   2 MC []Yes   [x]No     Latex Allergy:  [x]NO      []YES  Preferred Language for Healthcare:   [x]English       []other:  Functional Scale: JV: 12%  Date assessed:8/10/2020    Pain level:  2-3/10     SUBJECTIVE:  States she had a rough week personally with losing her sister in law so she did not get to be as active as she wanted or consistent with her HEP, states they also had to drive a lot out of town which made her back hurt.     OBJECTIVE: See eval   Observation:    Test measurements:      RESTRICTIONS/PRECAUTIONS: osteopenia, hx of breast cancer x16 years ago    Exercises/Interventions:     Therapeutic Ex (12602)   Min: 15 Sets/sec Reps Notes/CUES   Retro Stepper/BIKE      Alter G      BFR      Sportcord March      3 way SLR 2 10    SAQ      Clam ABD 2 10    Hip Ext /table 2 10 bridge   BOSU fwd/side lunge      BOSU squat      Leg Press Iso/Con/Ecc 0-      Cybex HS curl      TKE      Glute side walks      RDL      Slide Lunge      Slide HS eccentrics      Step ups/ecc step down      Swissball wall rolls- in SLS- hip drive      Quad hip ext/wall-ball rolls      Inferior hip stretch 30'' 3 Figure 4         Manual Intervention (59439)  Min: 20      Knee mobs/PROM 5'  IR    Tib/Fem Mobs      Patella Mobs      Ankle mobs      Hip belt mobs 15'  Distraction; Lateral, inferior grade 2-3         NMR re-education (20159)  Min: 10   CUES NEEDED   Andorran/Biofeedback 10/10      BFR      G. Med activation      Hip Ext full ROM/ G. Activation      Bosu Bal and Prop- G Med      Single leg stance/Balance/Prop      Bosu Retro G. Med act      Prone Hip froggers- sliders/elevated      TrA activation 5'' 10    Prone glute max activation 5'' 10                Therapeutic Activity (17154)  Min:      Ladders      Plyos      Dynamic Balance                            Therapeutic Exercise and NMR EXR  [x] (33914) Provided verbal/tactile cueing for activities related to strengthening, flexibility, endurance, ROM for improvements in LE, proximal hip, and core control with self care, mobility, lifting, ambulation. [x] (02442) Provided verbal/tactile cueing for activities related to improving balance, coordination, kinesthetic sense, posture, motor skill, proprioception  to assist with LE, proximal hip, and core control in self care, mobility, lifting, ambulation and eccentric single leg control.      NMR and Therapeutic Activities:    [x] (09490 or 43977) Provided verbal/tactile cueing for activities related to improving balance, coordination, kinesthetic sense, posture, motor skill, proprioception and motor activation to allow for proper function of core, proximal hip and LE with self care and ADLs and functional mobility.   [] (14164) Gait Re-education- Provided training and instruction to the patient for proper LE, core and proximal hip recruitment and positioning and eccentric body weight control with ambulation re-education including up and down stairs     Home Exercise Program:    [x] (81138) improvement in movement, function, and ADLs as indicated by Functional Deficits. [] Progressing: [] Met: [x] Not Met: [] Adjusted    Long Term Goals: To be achieved in: 4 weeks  1. Disability index score of 10% or less for the JV to assist with reaching prior level of function. [] Progressing: [] Met: [x] Not Met: [] Adjusted  2. Patient will demonstrate increased AROM to WNL, good LS mobility, good hip ROM to allow for proper joint functioning as indicated by patients Functional Deficits. [] Progressing: [] Met: [x] Not Met: [] Adjusted  3. Patient will demonstrate an increase in Strength to good proximal hip and core activation to allow for proper functional mobility as indicated by patients Functional Deficits. [] Progressing: [] Met: [x] Not Met: [] Adjusted  4. Patient will return to daily functional activities including position changes without increased symptoms or restriction. [] Progressing: [] Met: [x] Not Met: [] Adjusted  5. Patient will be able to complete a pilates class without increased sx or restriction. [] Progressing: [] Met: [x] Not Met: [] Adjusted       ASSESSMENT:  Pt tolerated treatment well. Improved hip IR following PROM and hip belt mobs. Reviewed anatomy and presentation of arthritis in the hip/back. Progressed strengthening as tolerated. Discussed adding small movement/iso muscle contraction when pt knows she will be sitting for a long time to prevent severity of stiffness when she gets out of the car. Treatment/Activity Tolerance:  [x] Patient tolerated treatment well [] Patient limited by fatique  [] Patient limited by pain  [] Patient limited by other medical complications  [] Other:     Overall Progression Towards Functional goals/ Treatment Progress Update:  [] Patient is progressing as expected towards functional goals listed. [] Progression is slowed due to complexities/Impairments listed. [] Progression has been slowed due to co-morbidities.   [x] Plan just implemented, too soon to assess goals progression <30days   [] Goals require adjustment due to lack of progress  [] Patient is not progressing as expected and requires additional follow up with physician  [] Other    Prognosis for POC: [x] Good [] Fair  [] Poor    Patient requires continued skilled intervention: [x] Yes  [] No        PLAN: See eval  [x] Continue per plan of care [] Alter current plan (see comments)  [x] Plan of care initiated [] Hold pending MD visit [] Discharge    Electronically signed by: Jules Pikn PT    Note: If patient does not return for scheduled/recommended follow up visits, this note will serve as a discharge from care along with the most recent update on progress.

## 2020-08-24 ENCOUNTER — HOSPITAL ENCOUNTER (OUTPATIENT)
Dept: PHYSICAL THERAPY | Age: 65
Setting detail: THERAPIES SERIES
Discharge: HOME | End: 2020-08-24
Payer: MEDICARE

## 2020-08-24 PROCEDURE — 97110 THERAPEUTIC EXERCISES: CPT

## 2020-08-24 PROCEDURE — 97140 MANUAL THERAPY 1/> REGIONS: CPT

## 2020-08-24 PROCEDURE — 97112 NEUROMUSCULAR REEDUCATION: CPT

## 2020-08-24 NOTE — FLOWSHEET NOTE
[place] [address] [facility] [address]  Phone: [phone] Fax: [phone]    Physical Therapy Treatment Note/ Progress Report:     Date:  2020    Patient Name:  Mary Carmen Brush    :  1955  MRN: [id number]  Restrictions/Precautions:    Medical/Treatment Diagnosis Information:  · Diagnosis: M54.5 Lumbar pain  · Treatment Diagnosis: M54.5 lumbar pain  Insurance/Certification information:  PT Insurance Information: medicare  Physician Information:  Referring Practitioner: Dr. Demetri Cat of care signed (Y/N):     Date of Patient follow up with Physician:      Progress Report: []  Yes  [x]  No     Date Range for reporting period:  Beginnin/10/2020  Ending:      Progress report due (10 Rx/or 30 days whichever is less): 3/0/3880     Recertification due (POC duration/ or 90 days whichever is less):  2020    Visit # Insurance Allowable Auth Needed   3 [facility] []Yes   [x]No     Latex Allergy:  [x]NO      []YES  Preferred Language for Healthcare:   [x]English       []other:  Functional Scale: JV: 12%  Date assessed:8/10/2020    Pain level:  0/10     SUBJECTIVE:  States back and hip are feeling good today. States this past week was much better than the week prior.      OBJECTIVE: See eval   Observation:    Test measurements:      RESTRICTIONS/PRECAUTIONS: osteopenia, hx of breast cancer x16 years ago    Exercises/Interventions:     Therapeutic Ex (41017)   Min: 20 Sets/sec Reps Notes/CUES   Retro Stepper/BIKE      Alter G      BFR      Sportcord March npv     3 way SLR 2 10    SAQ      Clam ABD 2 10    Hip Ext /table 2 10 bridge   BOSU fwd/side lunge      BOSU squat      Leg Press Iso/Con/Ecc 0-      Paloff press 2 10 bilateral   TKE      Glute side walks npv     RDL      Slide Lunge      Slide HS eccentrics      Step ups/ecc step down      Swissball wall rolls- in SLS- hip drive      Quad hip ext/wall-ball rolls      Inferior hip stretch 30'' 3 Figure 4         Manual Intervention (17393)  Min: 15      Knee mobs/PROM 5'  IR    Tib/Fem Mobs      Patella Mobs      Ankle mobs      Hip belt mobs 15'  Distraction; Lateral, inferior grade 2-3         NMR re-education (04313)  Min: 10   CUES NEEDED   Prydeinig/Biofeedback 10/10      BFR      G. Med activation      Hip Ext full ROM/ G. Activation      Bosu Bal and Prop- G Med      Single leg stance/Balance/Prop      Bosu Retro G. Med act      Prone Hip froggers- sliders/elevated      TrA activation 5'' 10    Prone glute max activation 5'' 10                Therapeutic Activity (62133)  Min:      Ladders      Plyos      Dynamic Balance                            Therapeutic Exercise and NMR EXR  [x] (74825) Provided verbal/tactile cueing for activities related to strengthening, flexibility, endurance, ROM for improvements in LE, proximal hip, and core control with self care, mobility, lifting, ambulation. [x] (17522) Provided verbal/tactile cueing for activities related to improving balance, coordination, kinesthetic sense, posture, motor skill, proprioception  to assist with LE, proximal hip, and core control in self care, mobility, lifting, ambulation and eccentric single leg control.      NMR and Therapeutic Activities:    [x] (24067 or 91306) Provided verbal/tactile cueing for activities related to improving balance, coordination, kinesthetic sense, posture, motor skill, proprioception and motor activation to allow for proper function of core, proximal hip and LE with self care and ADLs and functional mobility.   [] (82999) Gait Re-education- Provided training and instruction to the patient for proper LE, core and proximal hip recruitment and positioning and eccentric body weight control with ambulation re-education including up and down stairs     Home Exercise Program:    [x] (13939) Reviewed/Progressed HEP activities related to strengthening, flexibility, endurance, ROM of core, proximal hip and LE for functional self-care, mobility, lifting and ambulation/stair navigation   [] (88847)Reviewed/Progressed HEP activities related to improving balance, coordination, kinesthetic sense, posture, motor skill, proprioception of core, proximal hip and LE for self care, mobility, lifting, and ambulation/stair navigation      Manual Treatments:  PROM / STM / Oscillations-Mobs:  G-I, II, III, IV (PA's, Inf., Post.)  [] (26576) Provided manual therapy to mobilize LE, proximal hip and/or LS spine soft tissue/joints for the purpose of modulating pain, promoting relaxation,  increasing ROM, reducing/eliminating soft tissue swelling/inflammation/restriction, improving soft tissue extensibility and allowing for proper ROM for normal function with self care, mobility, lifting and ambulation. Modalities:     [] GAME READY (VASO)- for significant edema, swelling, pain control. Charges:  Timed Code Treatment Minutes: 45   Total Treatment Minutes: 45      [] EVAL (LOW) 28616 (typically 20 minutes face-to-face)  [] EVAL (MOD) 64649 (typically 30 minutes face-to-face)  [] EVAL (HIGH) 31205 (typically 45 minutes face-to-face)  [] RE-EVAL     [x] YJ(38029) x 1    [] DRY NEEDLE 1 OR 2 MUSCLES  [x] NMR (99717) x 1    [] DRY NEEDLE 3+ MUSCLES  [x] Manual (56515) x 1      [] TA (01127) x     [] Mech Traction (62814)  [] ES(attended) (01478)     [] ES (un) (77528):   [] VASO (85145)  [] Other:        GOALS:  Patient stated goal: \"get back to doing pilates and riding in a car without pain\"  [] Progressing: [] Met: [x] Not Met: [] Adjusted  Therapist goals for Patient:   Short Term Goals: To be achieved in: 2 weeks  1. Independent in HEP and progression per patient tolerance, in order to prevent re-injury. [] Progressing: [] Met: [x] Not Met: [] Adjusted  2. Patient will have a decrease in pain to facilitate improvement in movement, function, and ADLs as indicated by Functional Deficits.   [] Progressing: [] Met: [x] Not Met: [] Adjusted    Long Term Goals: To be achieved in: 4 weeks  1. Disability index score of 10% or less for the JV to assist with reaching prior level of function. [] Progressing: [] Met: [x] Not Met: [] Adjusted  2. Patient will demonstrate increased AROM to WNL, good LS mobility, good hip ROM to allow for proper joint functioning as indicated by patients Functional Deficits. [] Progressing: [] Met: [x] Not Met: [] Adjusted  3. Patient will demonstrate an increase in Strength to good proximal hip and core activation to allow for proper functional mobility as indicated by patients Functional Deficits. [] Progressing: [] Met: [x] Not Met: [] Adjusted  4. Patient will return to daily functional activities including position changes without increased symptoms or restriction. [] Progressing: [] Met: [x] Not Met: [] Adjusted  5. Patient will be able to complete a pilates class without increased sx or restriction. [] Progressing: [] Met: [x] Not Met: [] Adjusted       ASSESSMENT:  Pt tolerated treatment well. Hip mobility improving w/ improved tolerance to IR. Progressed strengthening as tolerated. Discussed potentially trying out pilates to determine tolerance. Treatment/Activity Tolerance:  [x] Patient tolerated treatment well [] Patient limited by fatique  [] Patient limited by pain  [] Patient limited by other medical complications  [] Other:     Overall Progression Towards Functional goals/ Treatment Progress Update:  [] Patient is progressing as expected towards functional goals listed. [] Progression is slowed due to complexities/Impairments listed. [] Progression has been slowed due to co-morbidities.   [x] Plan just implemented, too soon to assess goals progression <30days   [] Goals require adjustment due to lack of progress  [] Patient is not progressing as expected and requires additional follow up with physician  [] Other    Prognosis for POC: [x] Good [] Fair  [] Poor    Patient requires continued skilled intervention: [x] Yes  [] No        PLAN: See eval  [x] Continue per plan of care [] Alter current plan (see comments)  [] Plan of care initiated [] Hold pending MD visit [] Discharge    Electronically signed by: Shasta Neal PT    Note: If patient does not return for scheduled/recommended follow up visits, this note will serve as a discharge from care along with the most recent update on progress.

## 2020-09-01 ENCOUNTER — HOSPITAL ENCOUNTER (OUTPATIENT)
Dept: PHYSICAL THERAPY | Age: 65
Setting detail: THERAPIES SERIES
Discharge: HOME | End: 2020-09-01
Payer: MEDICARE

## 2020-09-01 PROCEDURE — 97140 MANUAL THERAPY 1/> REGIONS: CPT

## 2020-09-01 PROCEDURE — 97110 THERAPEUTIC EXERCISES: CPT

## 2020-09-01 NOTE — PLAN OF CARE
30 days whichever is less): 2/1/0178     Recertification due (POC duration/ or 90 days whichever is less):  9/7/2020    Visit # Insurance Allowable Auth Needed   [address] []Yes   [x]No     Latex Allergy:  [x]NO      []YES  Preferred Language for Healthcare:   [x]English       []other:  Functional Scale: JV: 12%  Date assessed:8/10/2020    Pain level:  0/10     SUBJECTIVE:  States back and hip have been feeling good. Has been able to do everything she wants as long as she does her home exercises. Would like to progress exercises as able and follow up as needed. OBJECTIVE: See eval   Observation:    Test measurements:      9/1/2020  ROM LEFT RIGHT Comments   Lumbar Side Bend [facility] No sx   Lumbar Rotation [facility] WFL No sx   Quadrant normal Normal    Hip Flexion 120 120     Hip Abd         Hip ER 45 45     Hip IR 40 40 No symptoms   Hip Extension 10 10     Knee Ext 0 0     Knee Flex 125 125     Hamstring Flex 150 150          RESTRICTIONS/PRECAUTIONS: osteopenia, hx of breast cancer x16 years ago    Exercises/Interventions:     Therapeutic Ex (86769)   Min: 35 Sets/sec Reps Notes/CUES   Retro Stepper/BIKE      Alter G      BFR      Sportcord March      3 way SLR 2 10    SAQ      Clam ABD 2 10    Hip Ext /table 2 10 bridge   BOSU fwd/side lunge      BOSU squat      Leg Press Iso/Con/Ecc 0-      Paloff press 2 10 bilateral   TKE      Glute side walks 2 10 lime   Chair squats 2 10    Slide Lunge      Slide HS eccentrics      Step ups/ecc step down      Swissball wall rolls- in SLS- hip drive      Quad hip ext/wall-ball rolls      Inferior hip stretch 30'' 3 Figure 4   Pt education 10'  Progression of exercises; HEP maintenance   Manual Intervention (08413)  Min: 10      Knee mobs/PROM 5'  IR    Tib/Fem Mobs      Patella Mobs      Ankle mobs      Hip belt mobs 5'  Distraction;  Lateral, inferior grade 2-3         NMR re-education (21730)  Min: 10   CUES NEEDED   Cameroonian/Biofeedback 10/10      BFR      G. Med activation Hip Ext full ROM/ G. Activation      Bosu Bal and Prop- G Med      Single leg stance/Balance/Prop      Bosu Retro G. Med act      Prone Hip froggers- sliders/elevated      TrA activation 5'' 10    Prone glute max activation 5'' 10                Therapeutic Activity (00139)  Min:      Ladders      Plyos      Dynamic Balance                            Therapeutic Exercise and NMR EXR  [x] (11181) Provided verbal/tactile cueing for activities related to strengthening, flexibility, endurance, ROM for improvements in LE, proximal hip, and core control with self care, mobility, lifting, ambulation. [x] (40870) Provided verbal/tactile cueing for activities related to improving balance, coordination, kinesthetic sense, posture, motor skill, proprioception  to assist with LE, proximal hip, and core control in self care, mobility, lifting, ambulation and eccentric single leg control.      HEP MedBridge code: [id number]    NMR and Therapeutic Activities:    [x] (13459 or 70835) Provided verbal/tactile cueing for activities related to improving balance, coordination, kinesthetic sense, posture, motor skill, proprioception and motor activation to allow for proper function of core, proximal hip and LE with self care and ADLs and functional mobility.   [] (29703) Gait Re-education- Provided training and instruction to the patient for proper LE, core and proximal hip recruitment and positioning and eccentric body weight control with ambulation re-education including up and down stairs     Home Exercise Program:    [x] (77310) Reviewed/Progressed HEP activities related to strengthening, flexibility, endurance, ROM of core, proximal hip and LE for functional self-care, mobility, lifting and ambulation/stair navigation   [] (94685)Reviewed/Progressed HEP activities related to improving balance, coordination, kinesthetic sense, posture, motor skill, proprioception of core, proximal hip and LE for self care, mobility, lifting, and ambulation/stair navigation      Manual Treatments:  PROM / STM / Oscillations-Mobs:  G-I, II, III, IV (PA's, Inf., Post.)  [] (42859) Provided manual therapy to mobilize LE, proximal hip and/or LS spine soft tissue/joints for the purpose of modulating pain, promoting relaxation,  increasing ROM, reducing/eliminating soft tissue swelling/inflammation/restriction, improving soft tissue extensibility and allowing for proper ROM for normal function with self care, mobility, lifting and ambulation. Modalities:     [] GAME READY (VASO)- for significant edema, swelling, pain control. Charges:  Timed Code Treatment Minutes: 45   Total Treatment Minutes: 45      [] EVAL (LOW) 37077 (typically 20 minutes face-to-face)  [] EVAL (MOD) 33232 (typically 30 minutes face-to-face)  [] EVAL (HIGH) 43602 (typically 45 minutes face-to-face)  [] RE-EVAL     [x] HM(04609) x 2    [] DRY NEEDLE 1 OR 2 MUSCLES  [] NMR (02209) x     [] DRY NEEDLE 3+ MUSCLES  [x] Manual (42572) x 1      [] TA (07480) x     [] Mech Traction (63496)  [] ES(attended) (33813)     [] ES (un) (64956):   [] VASO (63654)  [] Other:        GOALS:  Patient stated goal: \"get back to doing pilates and riding in a car without pain\"  [] Progressing: [x] Met: [] Not Met: [] Adjusted  Therapist goals for Patient:   Short Term Goals: To be achieved in: 2 weeks  1. Independent in HEP and progression per patient tolerance, in order to prevent re-injury. [] Progressing: [x] Met: [] Not Met: [] Adjusted  2. Patient will have a decrease in pain to facilitate improvement in movement, function, and ADLs as indicated by Functional Deficits. [] Progressing: [x] Met: [] Not Met: [] Adjusted    Long Term Goals: To be achieved in: 4 weeks  1. Disability index score of 10% or less for the JV to assist with reaching prior level of function. [] Progressing: [x] Met: [] Not Met: [] Adjusted  2.  Patient will demonstrate increased AROM to WNL, good LS mobility, good hip ROM to allow for proper joint functioning as indicated by patients Functional Deficits. [] Progressing: [x] Met: [] Not Met: [] Adjusted  3. Patient will demonstrate an increase in Strength to good proximal hip and core activation to allow for proper functional mobility as indicated by patients Functional Deficits. [] Progressing: [x] Met: [] Not Met: [] Adjusted  4. Patient will return to daily functional activities including position changes without increased symptoms or restriction. [] Progressing: [x] Met: [] Not Met: [] Adjusted  5. Patient will be able to complete a pilates class without increased sx or restriction. [] Progressing: [x] Met: [] Not Met: [] Adjusted       ASSESSMENT:  All goals met. Pt able to maintain symptoms w/ HEP. No increase in symptoms w/ reassessment of ROM, mobility, and strength. Pt discharged from PT today w/ updated HEP and instructions to follow up as needed. Treatment/Activity Tolerance:  [x] Patient tolerated treatment well [] Patient limited by fatique  [] Patient limited by pain  [] Patient limited by other medical complications  [] Other:     Overall Progression Towards Functional goals/ Treatment Progress Update:  [x] Patient is progressing as expected towards functional goals listed. [] Progression is slowed due to complexities/Impairments listed. [] Progression has been slowed due to co-morbidities.   [] Plan just implemented, too soon to assess goals progression <30days   [] Goals require adjustment due to lack of progress  [] Patient is not progressing as expected and requires additional follow up with physician  [] Other    Prognosis for POC: [x] Good [] Fair  [] Poor    Patient requires continued skilled intervention: [] Yes  [x] No        PLAN: Discharge from therapy w/ continued HEP; follow up as needed   [] Continue per plan of care [] Alter current plan (see comments)  [] Plan of care initiated [] Hold pending MD visit [x] Discharge    Electronically signed by: Rosi Liang, PT    Note: If patient does not return for scheduled/recommended follow up visits, this note will serve as a discharge from care along with the most recent update on progress.